# Patient Record
Sex: FEMALE | Race: AMERICAN INDIAN OR ALASKA NATIVE
[De-identification: names, ages, dates, MRNs, and addresses within clinical notes are randomized per-mention and may not be internally consistent; named-entity substitution may affect disease eponyms.]

---

## 2017-01-16 ENCOUNTER — HOSPITAL ENCOUNTER (OUTPATIENT)
Dept: HOSPITAL 5 - SPVWC | Age: 62
Discharge: HOME | End: 2017-01-16
Attending: INTERNAL MEDICINE
Payer: MEDICARE

## 2017-01-16 DIAGNOSIS — R92.8: Primary | ICD-10-CM

## 2017-01-16 NOTE — MAMMOGRAPHY REPORT
RIGHT DIGITAL DIAGNOSTIC MAMMOGRAM : 01/16/17 13:15:00



CLINICAL: Recalled for asymmetry.



COMPARISON:12/23/16 screening



FINDINGS: ML and spot compression   CC views were performed. 

Satisfactory effacement of the previously described asymmetry on the 

spot views. The lateral view is negative. 







IMPRESSION: Negative Mammogram.



BI-RADS CATEGORY:  1 -- Negative



RECOMMENDATION: Routine mammographic screening in one year.



ACR BI-RADS MAMMOGRAPHIC CODES:

0 = Needs additional imaging evaluation; 1 = Negative; 2 = Benign; 3 = 

Probably benign; 4 = Suspicious; 5 = Malignant; 6 = Known biopsy-proven 

malignancy



COMMENT:

      1.   Dense breast tissue, i.e., adenosis, fibrocystic 

            changes, etc., may obscure an underlying neoplasm.

      2.   Approximately 10% of cancers are not detected with

            mammography.

      3.   A negative mammography report should not delay biopsy 

            if a clinically suspicious mass is present.





COMMENT:

Patient follow-up letters are generated via our [employer] 

application.

## 2017-05-22 NOTE — EMERGENCY DEPARTMENT REPORT
Entered by EDIE MONROY, acting as scribe for ALYSON OREILLY PA.


Chief Complaint: Extremity Injury, Lower


Stated Complaint: PAIN IN LT LEG


Time Seen by Provider: 05/22/17 14:04





- HPI


History of Present Illness: 


Pt c/o severe left leg/calf pain. Pt states that the pain radiates from her 

left knee down posteriorly.





Denies any injury/trauma to left leg. Denies numbness, tingling, or loss of 

feeling in left leg.


Denies chest pain and SOB.





PMHx of asthma, COPD, sicle cell disease, and HTN


Pt states she is currently on blood thinners for previous PE.





PSHx of left shoulder replacement in October 2016.





Notes PE in left lung in Feb. 2017. Pt states she's not sure if it's a sickle 

cell flare-up. She is concerned about a possible blood clot.





Notes she seen her PCP and her urine white blood cell count was elevated over 

20k 2 weeks ago.





- ROS


Review of Systems: 


All systems are negative unless stated in the HPI above.





- Exam


Vital Signs: 


 Vital Signs











  05/22/17





  13:13


 


Temperature 98.8 F


 


Pulse Rate 95 H


 


Respiratory 18





Rate 


 


Blood Pressure 144/80


 


O2 Sat by Pulse 95





Oximetry 











Physical Exam: 


General: 62 y/o female that is well nourished, well developed, nontoxic in 

appearance, and in no acute distress





Cardiovascular: S1/S2 regular rate and rhythm





Respiratory: Clear to auscultation bilaterally, no rhonchi, wheezes, or rales.  

Normal work of breathing.  No use of accessory muscles





Extremities: FROM. Mild lateral/posterior left leg tenderness. 2+ pulses 

present to bilateral extremities.


MSE screening note: 


Focused history and physical exam performed.


Due to findings the following was ordered:











ED Medical Decision Making





- Radiology Data


Radiology results: pending





- Medical Decision Making


Patient was seen by provider in triage area. 





ED Disposition for MSE


Condition: Stable





This documentation as recorded by the scribe,EDIE MONROY,accurately 

reflects the service I personally performed and the decisions made by me,ALYSON OREILLY PA.

## 2017-05-22 NOTE — EMERGENCY DEPARTMENT REPORT
ED Lower Extremity HPI





- General


Chief Complaint: Extremity Injury, Lower


Stated Complaint: PAIN IN LT LEG


Time Seen by Provider: 05/22/17 22:25


Source: patient


Mode of arrival: Ambulatory


Limitations: No Limitations





- History of Present Illness


Initial Comments: 





Pt is a 61 yr old F with a history of sickle cell disease, HTN, DM, CHF, Asthma

, COPD, and PE on coumadin who presents with L lower leg pain. Pt reports 

excruciating aching pain in the left leg, it is consistent with her sickle cell 

pain, but concern for blood clot. Pt reports she does take coumadin 2.5mg 

alternately with 5mg. She has not missed any doses. Takes oxycodone at home 

when needed for pain control. Otherwise no fevers, chills, HA, N,V,D, SOB, CP, 

abd pain, trauma, travel, hemoptysis, falls, or sick contacts. 





- Related Data


 Home Medications











 Medication  Instructions  Recorded  Confirmed  Last Taken


 


Amlodipine Besylate 5 mg PO DAILY 03/13/14 05/22/17 02/24/17 08:00


 


Folic Acid 1 mg PO DAILY 03/13/14 05/22/17 02/24/17 08:00


 


Hydroxyurea 500 mg PO BID 03/13/14 05/22/17 02/24/17 08:00


 


Oxycodone HCl/Acetaminophen 1 each PO DAILY PRN 03/13/14 05/22/17 02/24/17 08:00





[Oxycodone-Acetaminophen 5-325]    


 


Temazepam [Restoril] 15 mg PO HS PRN 03/13/14 05/22/17 02/22/17 22:00


 


Warfarin [Coumadin] 5 mg PO DAILY 05/22/17 05/22/17 Unknown


 


traMADol [Ultram] 50 mg PO Q4HR PRN 05/22/17 05/22/17 Unknown








 Previous Rx's











 Medication  Instructions  Recorded  Last Taken  Type


 


HYDROcodone/APAP 5-325 [Wade 1 each PO Q6HR PRN #20 tablet 08/17/16 02/23/17 22

:00 Rx





5-325 mg TAB]    


 


ALBUTEROL Inhaler [ProAir HFA 2 puff IH QID PRN #30 inhalation 11/03/16 11/15/

16 Rx





Inhaler]   2 puffs 











 Allergies











Allergy/AdvReac Type Severity Reaction Status Date / Time


 


levofloxacin [From Levaquin] Allergy  Rash Verified 11/22/16 09:46














ED Review of Systems


ROS: 


Stated complaint: PAIN IN LT LEG


Other details as noted in HPI





Comment: All other systems reviewed and negative





ED Past Medical Hx





- Past Medical History


Previous Medical History?: Yes


Hx Hypertension: Yes


Hx Congestive Heart Failure: No


Hx Diabetes: No


Hx Sickle Cell Disease: Yes


Hx Asthma: Yes


Hx COPD: Yes


Hx HIV: No





- Surgical History


Past Surgical History?: Yes


Additional Surgical History: c-sec x 2.  Port upper right chest.  Rt Shoulder 

replacement 10/5/16





- Social History


Smoking Status: Never Smoker


Substance Use Type: None





- Medications


Home Medications: 


 Home Medications











 Medication  Instructions  Recorded  Confirmed  Last Taken  Type


 


Amlodipine Besylate 5 mg PO DAILY 03/13/14 05/22/17 02/24/17 08:00 History


 


Folic Acid 1 mg PO DAILY 03/13/14 05/22/17 02/24/17 08:00 History


 


Hydroxyurea 500 mg PO BID 03/13/14 05/22/17 02/24/17 08:00 History


 


Oxycodone HCl/Acetaminophen 1 each PO DAILY PRN 03/13/14 05/22/17 02/24/17 08:

00 History





[Oxycodone-Acetaminophen 5-325]     


 


Temazepam [Restoril] 15 mg PO HS PRN 03/13/14 05/22/17 02/22/17 22:00 History


 


HYDROcodone/APAP 5-325 [Wade 1 each PO Q6HR PRN #20 tablet 08/17/16 05/22/17 02 /23/17 22:00 Rx





5-325 mg TAB]     


 


ALBUTEROL Inhaler [ProAir HFA 2 puff IH QID PRN #30 inhalation 11/03/16 05/22/

17 11/15/16 Rx





Inhaler]    2 puffs 


 


Warfarin [Coumadin] 5 mg PO DAILY 05/22/17 05/22/17 Unknown History


 


traMADol [Ultram] 50 mg PO Q4HR PRN 05/22/17 05/22/17 Unknown History














ED Physical Exam





- General


Limitations: No Limitations


General appearance: alert, in no apparent distress





- Head


Head exam: Present: atraumatic, normocephalic





- Eye


Eye exam: Present: normal appearance





- ENT


ENT exam: Present: mucous membranes moist





- Neck


Neck exam: Present: normal inspection





- Respiratory


Respiratory exam: Present: normal lung sounds bilaterally.  Absent: respiratory 

distress





- Cardiovascular


Cardiovascular Exam: Present: regular rate, normal rhythm.  Absent: systolic 

murmur, diastolic murmur, rubs, gallop





- GI/Abdominal


GI/Abdominal exam: Present: soft, normal bowel sounds





- Extremities Exam


Extremities exam: Present: normal inspection, full ROM, normal capillary refill

, calf tenderness (left calf tenderness), other (no warmth, no erythema).  

Absent: pedal edema, joint swelling





- Back Exam


Back exam: Present: normal inspection





- Neurological Exam


Neurological exam: Present: alert, oriented X3





- Psychiatric


Psychiatric exam: Present: normal affect, normal mood





- Skin


Skin exam: Present: warm, dry, intact, normal color.  Absent: rash





ED Course


 Vital Signs











  05/22/17 05/22/17





  13:13 22:11


 


Temperature 98.8 F 


 


Pulse Rate 95 H 95 H


 


Respiratory 18 18





Rate  


 


Blood Pressure 144/80 


 


Blood Pressure  143/71





[Left]  


 


O2 Sat by Pulse 95 100





Oximetry  














ED Lower Extremity MDM





- Lab Data


Result diagrams: 


 05/22/17 16:11





 05/22/17 16:11





- Radiology Data


Radiology results: report reviewed





LE VENOUS: NO evidence of clot





- Medical Decision Making





Results discussed with patient. I did instruct patient to have doppler repeated 

within the week if her pain persists.





Pt's INR is 1.5, subtherapeutic in range. Will give lovenox here in the ED 

prior to discharge, and instructed the patient to take coumadin 5mg tonight as 

opposed to her 2.5 dose tonight and to resume her normal schedule as if she 

took 2.5mg tonight. Pt to repeat INR in two days. 


Critical care attestation.: 


If time is entered above; I have spent that time in minutes in the direct care 

of this critically ill patient, excluding procedure time.








ED Disposition


Clinical Impression: 


 Leg pain, left, Sickle cell anemia with pain





Disposition: DISCHARGED TO HOME OR SELFCARE


Is pt being admited?: No


Condition: Stable


Additional Instructions: 


PLEASE TAKE 5MG COUMADIN TONIGHT INSTEAD OF 2.5MG





THEN TOMORROW START BACK UP WITH 5MG ALTERNATING WITH YOUR 2.5MG DOSE THE 

FOLLOWING DAY





REPEAT INR IN 2 DAYS


Referrals: 


KYLIE REMY DO [Primary Care Provider] - 3-5 Days

## 2017-05-23 NOTE — VASCULAR LAB REPORT
Left Lower Extremity Venous Duplex Study:



Reason for Exam: Pain of the left lower extremity.



Comments on the Right:

A limited duplex study was done of the proximal veins of the right 

lower extremity. All veins visualized are freely compressible without 

evidence of internal echogenicity.  Flow is spontaneous and phasic 

throughout. No evidence of acute or chronic thrombus is seen in any of 

the vessels visualized.



Comments on the Left:

All veins visualized are freely compressible without evidence of 

internal echogenicity.  Flow is spontaneous and phasic throughout. No 

evidence of acute or chronic thrombus is seen in any of the vessels 

visualized.



Impression:

No evidence of acute or chronic deep venous thrombosis in the left 

lower extremity.

## 2018-01-08 NOTE — MAMMOGRAPHY REPORT
BILATERAL MAMMOGRAM:



FINDINGS:

The breast tissue is heterogeneously dense, which could obscure

detection of small masses (approximately 50%-75% glandular).  No mass, 

distortion, suspicious calcification, or skin change is seen. No 

interval change when compared to prior exam in April 2015. Right breast 

port for sickle cell disease.



CAD was utilized.



IMPRESSION:

Negative mammogram.  There is no mammographic evidence of

malignancy.



RECOMMENDATION:

Follow-up per ACS guidelines.



BI-RADS CATEGORY:  1 = Negative



ACR BI-RADS MAMMOGRAPHIC CODES:

0 = Needs additional imaging evaluation; 1 = Negative; 2 = Benign; 3 = 

Probably benign; 4 = Suspicious; 5 = Malignant; 6 = Known biopsy-proven 

malignancy



COMMENT:

      1.   Dense breast tissue, i.e., adenosis, fibrocystic 

            changes, etc., may obscure an underlying neoplasm.

      2.   Approximately 10% of cancers are not detected with

            mammography.

      3.   A negative mammography report should not delay biopsy 

            if a clinically suspicious mass is present.



COMMENT:

Patient follow-up letters are generated in "[employer].

## 2020-08-20 NOTE — MAMMOGRAPHY REPORT
DIGITAL SCREENING MAMMOGRAM WITH CAD, 8/20/2020



INDICATION: Routine screening mammography. 



TECHNIQUE:  Digital bilateral  2D mammography was obtained in the craniocaudal and mediolateral obliq
ue projections. This examination was interpreted with the benefit of Computer-Aided Detection analysi
s.



COMPARISON: 1/8/2018, 1/16/2017, 12/23/2016



FINDINGS: 



Breast Density: The breasts are heterogeneously dense, which may obscure small masses.



There is no evidence of dominant mass, suspicious calcifications or architectural distortion in eithe
r breast.



IMPRESSION:



Follow up recommendation: Routine yearly



BI-RADS Category 1:  Negative.



A "normal" or negative report should not discourage follow up or biopsy of a clinically significant f
inding.



A written summary of these findings will be mailed to the patient. The patient will be entered into a
 mammography reporting system which will generate a reminder letter for the patient's next appointmen
t at the appropriate interval.



The American College of Radiology recommends yearly mammograms starting at age 40 and continuing as l
bernarda as a woman is in good health.  Breast MRI is recommended for women with an approximate 20-25% or 
greater lifetime risk of breast cancer, including women with a strong family history of breast or ova
neelam cancer or who have been treated for Hodgkin's disease.



Signer Name: Arash Velasco MD 

Signed: 8/20/2020 10:25 AM

Workstation Name: [email or web address]

## 2022-02-17 NOTE — MAMMOGRAPHY REPORT
DIGITAL SCREENING MAMMOGRAM WITH CAD, 2/16/2022



CLINICAL INFORMATION / INDICATION: Routine screening



TECHNIQUE:  Digital bilateral 2D mammography was obtained in the craniocaudal and mediolateral obliqu
e projections. This examination was interpreted with the benefit of Computer-Aided Detection analysis
.



COMPARISON: 8/20/2020



FINDINGS: 



Breast Density: The breasts are heterogeneously dense, which may obscure small masses.



No dominant mass, suspicious calcifications, or architectural distortion in either breast. 



Right Port-A-Cath is seen. Lateral calcifications are stable.

 

IMPRESSION: No mammographic evidence of malignancy.



Follow up recommendation: Routine yearly



BI-RADS Category 2:  BENIGN.





-------------------------------------------------------------------------------------------

A "normal" or negative report should not discourage follow up or biopsy of a clinically significant f
inding.



A written summary of these findings will be mailed to the patient. The patient will be entered into a
 mammography reporting system which will generate a reminder letter for the patient's next appointmen
t at the appropriate interval.



The American College of Radiology recommends yearly mammograms starting at age 40 and continuing as l
bernarda as a woman is in good health.  Breast MRI is recommended for women with an approximate 20-25% or 
greater lifetime risk of breast cancer, including women with a strong family history of breast or ova
neelam cancer or who have been treated for Hodgkin's disease.



Signer Name: Harry Rizo MD 

Signed: 2/17/2022 9:29 AM

Workstation Name: [email or web address]

## 2022-05-24 NOTE — MAMMOGRAPHY REPORT
DEXA BONE DENSITY SCAN



INDICATION / CLINICAL INFORMATION: M85.80 OTHER SPECIFIED DISORDERS OF BONE DENSITY.

66 years Female



COMPARISON: None available.



LUMBAR SPINE, L1-L4:

- Bone mineral density (BMD) = 0.952 g/cm2.

- T-score = -1.8 

- Change (%) since most recent prior (if available):  None available.



LEFT HIP, TOTAL :

- Bone mineral density (BMD) = 0.933 g/cm2.

- T-score = -0.6 

- Change (%) since most recent prior (if available):  None available.







IMPRESSION:

1. WHO Classification: Osteopenia. Fracture Risk: Increased. 

2. 10-Year Fracture Risk (FRAX) = Major Osteoporotic Not reported.% / Hip: Not reported.%





Note: 10-Year Fracture Risk (FRAX) not reported. This DEXA unit lacks FRAX functionality.







=================================================================

BMD Reporting Guidelines (ISCD, 2015)

=================================================================

BMD Reporting in Postmenopausal Women and in Men Age 50 and Older

- T-scores are preferred.

- The WHO densitometric classification is applicable.



BMD Reporting in Females Prior to Menopause and in Males Younger Than Age 50

- Z-scores, not T-scores, are preferred. This is particularly important in children.

- A Z-score of -2.0 or lower is defined as below the expected range for age, and a Z-score above -2.0
 is within the expected range for age.

- Osteoporosis cannot be diagnosed in men under age 50 on the basis of BMD alone.

- The WHO diagnostic criteria may be applied to women in the menopausal transition.





http://www.iscd.org/official-positions/3367-msie-izndifww-positions-adult/





Signer Name: BARBARA Starks MD 

Signed: 5/24/2022 4:25 PM

Workstation Name: [id number]

## 2022-06-09 NOTE — MAGNETIC RESONANCE REPORT
MRI ABDOMEN WITHOUT CONTRAST



INDICATION / CLINICAL INFORMATION:

R16.0  E83.19, SICKLE CELL DISEASE, ABD/LIVER PAIN.



TECHNIQUE:

Multiplanar, multisequence series were obtained through the abdomen.



COMPARISON:

Ultrasound abdomen 12/16/2018



FINDINGS:

LIVER: The liver is mildly enlarged with the right hepatic lobe measuring 17 cm in length in the jenise
nal plane. There is diffuse decreased signal throughout the liver on T2 imaging and gradient imaging 
consistent with diffuse iron deposition. No focal liver mass is detected. No cirrhotic changes are ap
preciated.

GALLBLADDER: There are numerous small gallstones in the gallbladder. No evidence for abnormal dilatat
ion, wall thickening or pericholecystic fluid.  

BILE DUCTS: No significant abnormality. The CBD measures 4 mm. No evidence for choledocholithiasis al
though MRCP was not performed.

PANCREAS: No significant abnormality.

SPLEEN: The spleen is small in size measuring 3.9 cm in length and demonstrates diffuse calcification
 or iron deposition.

ADRENALS: No significant abnormality.

RIGHT KIDNEY AND URETER: No significant abnormality.

LEFT KIDNEY AND URETER: No significant abnormality.



STOMACH AND VISUALIZED BOWEL: No significant abnormality. 

PERITONEUM: No free fluid. No free air. No fluid collection.

LYMPH NODES: No significant adenopathy.

AORTA and ARTERIES: No significant abnormality. 

IVC and VEINS: No significant abnormality.



ADDITIONAL FINDINGS: None.



SKELETAL SYSTEM: No acute injury or suspicious bony lesion. Mild diffuse iron deposition is noted in 
the visualized bony structures.



IMPRESSION:

 No acute inflammatory process. 

Mild hepatomegaly with diffuse iron deposition throughout the liver. No hepatic mass or cirrhotic ashkan
nges are appreciated.

Atrophic spleen.

Cholelithiasis but no evidence for acute cholecystitis or choledocholithiasis.



Signer Name: Albert Terrazas Jr, MD 

Signed: 6/9/2022 9:22 AM

Workstation Name: [id number]